# Patient Record
Sex: FEMALE | Race: BLACK OR AFRICAN AMERICAN | NOT HISPANIC OR LATINO | URBAN - METROPOLITAN AREA
[De-identification: names, ages, dates, MRNs, and addresses within clinical notes are randomized per-mention and may not be internally consistent; named-entity substitution may affect disease eponyms.]

---

## 2021-12-14 ENCOUNTER — EMERGENCY (EMERGENCY)
Facility: HOSPITAL | Age: 56
LOS: 0 days | Discharge: HOME | End: 2021-12-14
Attending: STUDENT IN AN ORGANIZED HEALTH CARE EDUCATION/TRAINING PROGRAM
Payer: COMMERCIAL

## 2021-12-14 VITALS
DIASTOLIC BLOOD PRESSURE: 97 MMHG | RESPIRATION RATE: 14 BRPM | HEART RATE: 83 BPM | TEMPERATURE: 98 F | SYSTOLIC BLOOD PRESSURE: 148 MMHG | HEIGHT: 66 IN | OXYGEN SATURATION: 99 % | WEIGHT: 225.97 LBS

## 2021-12-14 DIAGNOSIS — M54.50 LOW BACK PAIN, UNSPECIFIED: ICD-10-CM

## 2021-12-14 DIAGNOSIS — M25.561 PAIN IN RIGHT KNEE: ICD-10-CM

## 2021-12-14 DIAGNOSIS — M54.2 CERVICALGIA: ICD-10-CM

## 2021-12-14 DIAGNOSIS — M25.562 PAIN IN LEFT KNEE: ICD-10-CM

## 2021-12-14 PROCEDURE — 99284 EMERGENCY DEPT VISIT MOD MDM: CPT

## 2021-12-14 RX ORDER — METFORMIN HYDROCHLORIDE 850 MG/1
0 TABLET ORAL
Qty: 0 | Refills: 0 | DISCHARGE

## 2021-12-14 RX ORDER — GABAPENTIN 400 MG/1
0 CAPSULE ORAL
Qty: 0 | Refills: 0 | DISCHARGE

## 2021-12-14 RX ORDER — ATORVASTATIN CALCIUM 80 MG/1
0 TABLET, FILM COATED ORAL
Qty: 0 | Refills: 0 | DISCHARGE

## 2021-12-14 NOTE — ED ADULT NURSE NOTE - OBJECTIVE STATEMENT
A&Ox4, c/o muscle pain in neck, lower back, bilateral knees. pt states she has had pain for 9 years, decided to get check out today because  is sick and she was already here with him. denies chest pain, sob, dizziness, headache, blurry vision.

## 2021-12-14 NOTE — ED PROVIDER NOTE - PATIENT PORTAL LINK FT
You can access the FollowMyHealth Patient Portal offered by [facility] by registering at the following website: [email or web address]. By joining [employer]’s FollowMyHealth portal, you will also be able to view your health information using other applications (apps) compatible with our system.

## 2021-12-14 NOTE — ED PROVIDER NOTE - PHYSICAL EXAMINATION
General: Awake, alert and oriented. No acute distress. Well developed, hydrated and nourished. Appears stated age.   Skin: Skin in warm, dry and intact without rashes or lesions. Appropriate color for ethnicity  HENMT: head normocephalic and atraumatic; bilateral external ears without swelling. no nasal discharge. moist oral mucosa. supple neck, trachea midline  EYES: Conjunctiva clear. nonicteric sclera. EOM intact, Eyelids are normal in appearance without swelling or lesions.  Cardiac: well perfused  Respiratory: breathing comfortably on room air. no audible wheezing or stridor  Abdominal: nondistended  MSK: Neck and back are without deformity, visible external skin changes, or signs of trauma. Curvature of the cervical, thoracic, and lumbar spine are within normal limits. no external signs of trauma. no apparent deficits in ROM of any extremity  Neurological: The patient is awake, alert and oriented to person, place, and time with normal speech. CN 2-12 grossly intact. no apparent deficits. Memory is normal and thought process is intact. No gait abnormalities are appreciated.   Psychiatric: Appropriate mood and affect. Good judgement and insight. No visual or auditory hallucinations.

## 2021-12-14 NOTE — ED PROVIDER NOTE - OBJECTIVE STATEMENT
56f presenting for neck, lower back, and bilateral knee pain for 10-15 years. had a recent normal MRI of her spine and normal xrays of her knees. being evaluated today as her  is here and she figured she might as well get checked out. no recent trauma, falls, or illness

## 2021-12-14 NOTE — ED PROVIDER NOTE - CLINICAL SUMMARY MEDICAL DECISION MAKING FREE TEXT BOX
[FreeTextEntry3] : I, Gregory Fregoso MD, ordering physician, have read and attest that all the information, medical decision making and discharge instructions within are true and accurate. 
declining pain medications. no acute pathology. recent normal imaging. will dc with rheumatology follow up

## 2021-12-14 NOTE — ED ADULT TRIAGE NOTE - STATUS:
Applied 1. I was told the name of the doctor(s) who took care of my child while in the hospital.    2. I have been told about any important findings on my child's plan of care.    3. The doctor clearly explained my child's diagnosis and other possible diagnoses that were considered.    4. My child's doctor explained all the tests that were done and their results (if available). I understand that some of the test results may not be ready before we go home and I was told how I can get these results. I understand that a summary of my child's hospitalization and important test results will be shared with my child's outpatient doctor.    5. My child's doctor talked to me about what I need to do when we go home.    6. I understand what signs and symptoms to watch for. I understand what symptoms I would need to call my doctor for and/or return to the hospital.    7. I have the phone number to call the hospital for results and/or questions after I leave the hospital.

## 2021-12-14 NOTE — ED PROVIDER NOTE - CARE PROVIDER_API CALL
Sinan Krishnamurthy (MD)  Internal Medicine; Rheumatology  [facility] [place] [address]  Phone: [phone]  Fax: [phone]  Follow Up Time:

## 2021-12-14 NOTE — ED ADULT TRIAGE NOTE - CHIEF COMPLAINT QUOTE
Patient ambulatory to triage c/o spine, right  shoulder and upper back and b/l knee pains for some times now. Patient is been followed by her neurologist and had x rays of knees recently. She is here with her  and decided to be checked out while waiting with him.

## 2021-12-14 NOTE — ED ADULT NURSE NOTE - NSFALLRSKUNASSIST_ED_ALL_ED
Additional Notes: Talked to patient about using less fragrant lotion like ceraVe, and all free and clear laundry detergent no

## 2024-04-28 ENCOUNTER — EMERGENCY (EMERGENCY)
Facility: HOSPITAL | Age: 59
LOS: 0 days | Discharge: HOME | End: 2024-04-28
Attending: STUDENT IN AN ORGANIZED HEALTH CARE EDUCATION/TRAINING PROGRAM
Payer: COMMERCIAL

## 2024-04-28 VITALS
TEMPERATURE: 99 F | RESPIRATION RATE: 18 BRPM | HEART RATE: 84 BPM | SYSTOLIC BLOOD PRESSURE: 146 MMHG | DIASTOLIC BLOOD PRESSURE: 83 MMHG | OXYGEN SATURATION: 99 %

## 2024-04-28 VITALS
RESPIRATION RATE: 22 BRPM | HEART RATE: 103 BPM | SYSTOLIC BLOOD PRESSURE: 155 MMHG | HEIGHT: 66 IN | WEIGHT: 229.94 LBS | DIASTOLIC BLOOD PRESSURE: 87 MMHG | TEMPERATURE: 98 F | OXYGEN SATURATION: 96 %

## 2024-04-28 DIAGNOSIS — J18.9 PNEUMONIA, UNSPECIFIED ORGANISM: ICD-10-CM

## 2024-04-28 DIAGNOSIS — J45.909 UNSPECIFIED ASTHMA, UNCOMPLICATED: ICD-10-CM

## 2024-04-28 DIAGNOSIS — E78.00 PURE HYPERCHOLESTEROLEMIA, UNSPECIFIED: ICD-10-CM

## 2024-04-28 DIAGNOSIS — R06.02 SHORTNESS OF BREATH: ICD-10-CM

## 2024-04-28 DIAGNOSIS — E11.9 TYPE 2 DIABETES MELLITUS WITHOUT COMPLICATIONS: ICD-10-CM

## 2024-04-28 DIAGNOSIS — U07.1 COVID-19: ICD-10-CM

## 2024-04-28 DIAGNOSIS — R05.9 COUGH, UNSPECIFIED: ICD-10-CM

## 2024-04-28 DIAGNOSIS — R07.9 CHEST PAIN, UNSPECIFIED: ICD-10-CM

## 2024-04-28 DIAGNOSIS — J90 PLEURAL EFFUSION, NOT ELSEWHERE CLASSIFIED: ICD-10-CM

## 2024-04-28 LAB
ALBUMIN SERPL ELPH-MCNC: 3 G/DL — LOW (ref 3.3–5)
ALP SERPL-CCNC: 106 U/L — SIGNIFICANT CHANGE UP (ref 40–120)
ALT FLD-CCNC: 19 U/L — SIGNIFICANT CHANGE UP (ref 12–78)
ANION GAP SERPL CALC-SCNC: 4 MMOL/L — LOW (ref 5–17)
APTT BLD: 33.2 SEC — SIGNIFICANT CHANGE UP (ref 24.5–35.6)
AST SERPL-CCNC: 20 U/L — SIGNIFICANT CHANGE UP (ref 15–37)
BASOPHILS # BLD AUTO: 0.04 K/UL — SIGNIFICANT CHANGE UP (ref 0–0.2)
BASOPHILS NFR BLD AUTO: 0.6 % — SIGNIFICANT CHANGE UP (ref 0–2)
BILIRUB SERPL-MCNC: 0.2 MG/DL — SIGNIFICANT CHANGE UP (ref 0.2–1.2)
BUN SERPL-MCNC: 14 MG/DL — SIGNIFICANT CHANGE UP (ref 7–23)
CALCIUM SERPL-MCNC: 9.2 MG/DL — SIGNIFICANT CHANGE UP (ref 8.5–10.1)
CHLORIDE SERPL-SCNC: 108 MMOL/L — SIGNIFICANT CHANGE UP (ref 96–108)
CO2 SERPL-SCNC: 30 MMOL/L — SIGNIFICANT CHANGE UP (ref 22–31)
CREAT SERPL-MCNC: 0.83 MG/DL — SIGNIFICANT CHANGE UP (ref 0.5–1.3)
EGFR: 82 ML/MIN/1.73M2 — SIGNIFICANT CHANGE UP
EOSINOPHIL # BLD AUTO: 0.21 K/UL — SIGNIFICANT CHANGE UP (ref 0–0.5)
EOSINOPHIL NFR BLD AUTO: 3.3 % — SIGNIFICANT CHANGE UP (ref 0–6)
FLUAV AG NPH QL: SIGNIFICANT CHANGE UP
FLUBV AG NPH QL: SIGNIFICANT CHANGE UP
GLUCOSE SERPL-MCNC: 114 MG/DL — HIGH (ref 70–99)
HCT VFR BLD CALC: 35.8 % — SIGNIFICANT CHANGE UP (ref 34.5–45)
HGB BLD-MCNC: 12 G/DL — SIGNIFICANT CHANGE UP (ref 11.5–15.5)
IMM GRANULOCYTES NFR BLD AUTO: 0.2 % — SIGNIFICANT CHANGE UP (ref 0–0.9)
INR BLD: 0.95 RATIO — SIGNIFICANT CHANGE UP (ref 0.85–1.18)
LYMPHOCYTES # BLD AUTO: 1.86 K/UL — SIGNIFICANT CHANGE UP (ref 1–3.3)
LYMPHOCYTES # BLD AUTO: 29.6 % — SIGNIFICANT CHANGE UP (ref 13–44)
MCHC RBC-ENTMCNC: 28.4 PG — SIGNIFICANT CHANGE UP (ref 27–34)
MCHC RBC-ENTMCNC: 33.5 G/DL — SIGNIFICANT CHANGE UP (ref 32–36)
MCV RBC AUTO: 84.6 FL — SIGNIFICANT CHANGE UP (ref 80–100)
MONOCYTES # BLD AUTO: 0.5 K/UL — SIGNIFICANT CHANGE UP (ref 0–0.9)
MONOCYTES NFR BLD AUTO: 7.9 % — SIGNIFICANT CHANGE UP (ref 2–14)
NEUTROPHILS # BLD AUTO: 3.67 K/UL — SIGNIFICANT CHANGE UP (ref 1.8–7.4)
NEUTROPHILS NFR BLD AUTO: 58.4 % — SIGNIFICANT CHANGE UP (ref 43–77)
NRBC # BLD: 0 /100 WBCS — SIGNIFICANT CHANGE UP (ref 0–0)
NT-PROBNP SERPL-SCNC: 123 PG/ML — SIGNIFICANT CHANGE UP (ref 0–125)
PLATELET # BLD AUTO: 320 K/UL — SIGNIFICANT CHANGE UP (ref 150–400)
POTASSIUM SERPL-MCNC: 3.6 MMOL/L — SIGNIFICANT CHANGE UP (ref 3.5–5.3)
POTASSIUM SERPL-SCNC: 3.6 MMOL/L — SIGNIFICANT CHANGE UP (ref 3.5–5.3)
PROT SERPL-MCNC: 8.2 GM/DL — SIGNIFICANT CHANGE UP (ref 6–8.3)
PROTHROM AB SERPL-ACNC: 11.3 SEC — SIGNIFICANT CHANGE UP (ref 9.5–13)
RBC # BLD: 4.23 M/UL — SIGNIFICANT CHANGE UP (ref 3.8–5.2)
RBC # FLD: 15.1 % — HIGH (ref 10.3–14.5)
SARS-COV-2 RNA SPEC QL NAA+PROBE: DETECTED
SODIUM SERPL-SCNC: 142 MMOL/L — SIGNIFICANT CHANGE UP (ref 135–145)
TROPONIN I, HIGH SENSITIVITY RESULT: 8.2 NG/L — SIGNIFICANT CHANGE UP
WBC # BLD: 6.29 K/UL — SIGNIFICANT CHANGE UP (ref 3.8–10.5)
WBC # FLD AUTO: 6.29 K/UL — SIGNIFICANT CHANGE UP (ref 3.8–10.5)

## 2024-04-28 PROCEDURE — 93010 ELECTROCARDIOGRAM REPORT: CPT

## 2024-04-28 PROCEDURE — 71260 CT THORAX DX C+: CPT | Mod: 26,MC

## 2024-04-28 PROCEDURE — 99285 EMERGENCY DEPT VISIT HI MDM: CPT

## 2024-04-28 PROCEDURE — 71046 X-RAY EXAM CHEST 2 VIEWS: CPT | Mod: 26

## 2024-04-28 RX ORDER — IPRATROPIUM/ALBUTEROL SULFATE 18-103MCG
3 AEROSOL WITH ADAPTER (GRAM) INHALATION ONCE
Refills: 0 | Status: COMPLETED | OUTPATIENT
Start: 2024-04-28 | End: 2024-04-28

## 2024-04-28 RX ORDER — CEFPODOXIME PROXETIL 100 MG
200 TABLET ORAL ONCE
Refills: 0 | Status: DISCONTINUED | OUTPATIENT
Start: 2024-04-28 | End: 2024-04-28

## 2024-04-28 RX ORDER — CEFUROXIME AXETIL 250 MG
1 TABLET ORAL
Qty: 10 | Refills: 0
Start: 2024-04-28 | End: 2024-05-02

## 2024-04-28 RX ORDER — METHOCARBAMOL 500 MG/1
750 TABLET, FILM COATED ORAL ONCE
Refills: 0 | Status: COMPLETED | OUTPATIENT
Start: 2024-04-28 | End: 2024-04-28

## 2024-04-28 RX ORDER — METHOCARBAMOL 500 MG/1
1 TABLET, FILM COATED ORAL
Qty: 9 | Refills: 0
Start: 2024-04-28 | End: 2024-04-30

## 2024-04-28 RX ORDER — CEFUROXIME AXETIL 250 MG
500 TABLET ORAL ONCE
Refills: 0 | Status: COMPLETED | OUTPATIENT
Start: 2024-04-28 | End: 2024-04-28

## 2024-04-28 RX ORDER — ACETAMINOPHEN 500 MG
1000 TABLET ORAL ONCE
Refills: 0 | Status: COMPLETED | OUTPATIENT
Start: 2024-04-28 | End: 2024-04-28

## 2024-04-28 RX ADMIN — Medication 100 MILLIGRAM(S): at 16:14

## 2024-04-28 RX ADMIN — METHOCARBAMOL 750 MILLIGRAM(S): 500 TABLET, FILM COATED ORAL at 21:41

## 2024-04-28 RX ADMIN — Medication 500 MILLIGRAM(S): at 21:19

## 2024-04-28 RX ADMIN — Medication 400 MILLIGRAM(S): at 21:14

## 2024-04-28 RX ADMIN — Medication 3 MILLILITER(S): at 16:37

## 2024-04-28 NOTE — ED PROVIDER NOTE - NS ED ROS FT
CONSTITUTIONAL: No fever, no chills, no fatigue  EYES: No visual changes  ENT: No ear pain, no sore throat  CARDIOVASCULAR:  no palpitations  GI: No abdominal pain, no nausea, no vomiting, no constipation, no diarrhea  GENITOURINARY: No dysuria, no frequency, no hematuria  MUSKULOSKELETAL: No backpain, no joint pain, no myalgias  SKIN: No rash  NEURO: No headache    ALL OTHER SYSTEMS NEGATIVE.

## 2024-04-28 NOTE — ED ADULT TRIAGE NOTE - CHIEF COMPLAINT QUOTE
pt c/o chest pain and shortness of breath for a week. pt was seen at urgent care today, had x-ray that shows possible PNA. history of dm and high cholesterol. fs 187

## 2024-04-28 NOTE — ED PROVIDER NOTE - OBJECTIVE STATEMENT
57 y/o female with dm, high chol, asthma presents with dyspnea and chest pain x 1 week. Pt reports having cough , sore throat. Pt went to  last monday and was given azithromycin for PNA. Pt went to follow up  today and had cxr done showing PNA  and pleural effusion and told to come here. Denies recent travel. Pt states  sick at home with cough. Denies fever, chills, nausea, vomiting, abdominal pain, diarrhea.

## 2024-04-28 NOTE — ED PROVIDER NOTE - ATTENDING APP SHARED VISIT CONTRIBUTION OF CARE
Lena: Pt seen w/ PA, 58F PMH HLD, DM sent to ED d/t worsening PNA. Pt reports CP, SOB and cough, went to UC, prescribed Z-daniela for R sided PNA. Pt w/ persistent symptoms, rpt CXR w/ R pleural effusion and L sided PNA. Afebrile, VSS. Agree w/ planned w/u and dispo. Lena: Pt seen w/ PA, 58F PMH HLD, DM sent to ED d/t worsening PNA. Pt reports ST, cough, body aches, CP and SOB since last Tues, went to UC last Sun, prescribed Z-daniela for 'chest infection.' Pt reports improvement in pain, but worsening SOB, 'spasms.' Pt return to UC today, CXR w/ R pleural effusion and L sided PNA. Pt reports similar sx w/ pleurisy in 2006. Afebrile, VSS. Agree w/ planned w/u and dispo.

## 2024-04-28 NOTE — ED PROVIDER NOTE - CLINICAL SUMMARY MEDICAL DECISION MAKING FREE TEXT BOX
59 y/o female with dm, high chol, asthma here with chest pain and dyspnea x 1 week. Vs stable.   Will obtain basic labs including cardiac, and repeat cxr, flu/covid and treat with tessalon perles, neb and reassess. 59 y/o female with dm, high chol, asthma here with chest pain and dyspnea x 1 week. Vs stable.   Will obtain basic labs including cardiac, and repeat cxr, flu/covid , and possiblly get ct chest will also  treat with tessalon perles, neb and reassess.    labs reviewed and unremarkable. (+) COVID.   CT chest: There is an approximately 2.5 x 6.9 cm soft tissue mass within the   anterior mediastinum, concerning for malignancy. Correlation with prior   imaging is recommended if available. Further evaluation with either chest   MRI without and with contrast and/or biopsy is suggested.    Small right pleural effusion with multifocal bibasilar streaky opacities   and right-sided volume loss, favoring multifocal atelectasis, though   superimposed infection is possible in the appropriate clinical setting.    Pt made aware of the ct chest results and advised to follow up with PMD and pulmonary outpatient. WIll also cover with ABX cefuroxime.   Vs otherwise stable. Not hypoxic. Pt stable to be discharged home. Strict return precautions given.

## 2024-04-28 NOTE — ED ADULT NURSE NOTE - NSFALLUNIVINTERV_ED_ALL_ED
Bed/Stretcher in lowest position, wheels locked, appropriate side rails in place/Call bell, personal items and telephone in reach/Instruct patient to call for assistance before getting out of bed/chair/stretcher/Non-slip footwear applied when patient is off stretcher/Scenic to call system/Physically safe environment - no spills, clutter or unnecessary equipment/Purposeful proactive rounding/Room/bathroom lighting operational, light cord in reach

## 2024-04-28 NOTE — ED ADULT NURSE REASSESSMENT NOTE - NS ED NURSE REASSESS COMMENT FT1
Patient placed on airborne precautions due to COVID positive results. No SOB noted to patient. Cardiac monitoring in place. Will continue to monitor.

## 2024-04-28 NOTE — ED PROVIDER NOTE - NSFOLLOWUPINSTRUCTIONS_ED_ALL_ED_FT
Rest, drink plenty of fluids.  Advance activity as tolerated.  Continue all previously prescribed medications as directed.  Follow up with your primary care physician and pulmonary  in 48-72 hours- bring copies of your results.  Return to the ER for worsening or persistent symptoms, and/or ANY NEW OR CONCERNING SYMPTOMS. If you have issues obtaining follow up, please call: [phone] to obtain a doctor or specialist who takes your insurance in your area.  You may call [phone] to make an appointment with the internal medicine clinic.

## 2024-04-28 NOTE — ED PROVIDER NOTE - CARE PLAN
Principal Discharge DX:	Pneumonia   1 Principal Discharge DX:	Pneumonia  Secondary Diagnosis:	COVID-19 virus infection

## 2024-04-28 NOTE — ED PROVIDER NOTE - PATIENT PORTAL LINK FT
You can access the FollowMyHealth Patient Portal offered by [facility] by registering at the following website: [email or web address]. By joining [employer]’s FollowMyHealth portal, you will also be able to view your health information using other applications (apps) compatible with our system.

## 2024-04-28 NOTE — ED PROVIDER NOTE - NSFOLLOWUPCLINICS_GEN_ALL_ED_FT
[facility] Pulmonolgy and Sleep Medicine  Pulmonology  [address] [place] [address], [facility] [address]  [place] [address]  Phone: [phone]  Fax:   Follow Up Time: 1-3 Days

## 2024-05-01 PROBLEM — Z00.00 ENCOUNTER FOR PREVENTIVE HEALTH EXAMINATION: Status: ACTIVE | Noted: 2024-05-01

## 2024-05-06 ENCOUNTER — APPOINTMENT (OUTPATIENT)
Dept: PULMONOLOGY | Facility: CLINIC | Age: 59
End: 2024-05-06
Payer: COMMERCIAL

## 2024-05-06 VITALS
OXYGEN SATURATION: 100 % | WEIGHT: 229 LBS | HEIGHT: 66 IN | SYSTOLIC BLOOD PRESSURE: 145 MMHG | HEART RATE: 84 BPM | DIASTOLIC BLOOD PRESSURE: 87 MMHG | BODY MASS INDEX: 36.8 KG/M2

## 2024-05-06 VITALS
WEIGHT: 231 LBS | TEMPERATURE: 98.1 F | RESPIRATION RATE: 15 BRPM | OXYGEN SATURATION: 96 % | HEIGHT: 66 IN | SYSTOLIC BLOOD PRESSURE: 134 MMHG | BODY MASS INDEX: 37.12 KG/M2 | HEART RATE: 77 BPM | DIASTOLIC BLOOD PRESSURE: 76 MMHG

## 2024-05-06 PROCEDURE — 94729 DIFFUSING CAPACITY: CPT

## 2024-05-06 PROCEDURE — 99203 OFFICE O/P NEW LOW 30 MIN: CPT | Mod: 25

## 2024-05-06 PROCEDURE — 94060 EVALUATION OF WHEEZING: CPT

## 2024-05-06 PROCEDURE — 94726 PLETHYSMOGRAPHY LUNG VOLUMES: CPT

## 2024-05-06 RX ORDER — ALBUTEROL SULFATE 90 UG/1
108 (90 BASE) INHALANT RESPIRATORY (INHALATION)
Qty: 1 | Refills: 11 | Status: ACTIVE | COMMUNITY
Start: 2024-05-06 | End: 1900-01-01

## 2024-05-07 ENCOUNTER — NON-APPOINTMENT (OUTPATIENT)
Age: 59
End: 2024-05-07

## 2024-05-08 ENCOUNTER — NON-APPOINTMENT (OUTPATIENT)
Age: 59
End: 2024-05-08

## 2024-05-08 LAB
AFP-TM SERPL-MCNC: 11.8 NG/ML
HCG SERPL-MCNC: <1 MIU/ML
LDH SERPL-CCNC: 223 U/L
TSH SERPL-ACNC: 2.74 UIU/ML

## 2024-05-08 NOTE — ADDENDUM
[FreeTextEntry1] : Addendum (Natalia; 5/8/24): I called Ms. Martinez about her biomarker studies for her medisatinal mass  hCG: negative AFP 11.8 (wnl <8.3) LDH: 223 TSH: 2.74  The AFP is slightly elevated, which is of unclear significance. This evaluation makes teratoma or lymphoma less likely. She has a PET/CT scheduled for next week as well as her visit with Dr. Crocker. She has a GI visit on Friday. She will call me with questions.

## 2024-05-08 NOTE — REVIEW OF SYSTEMS
[Negative] : Endocrine [TextBox_3] : Chronic intermittent night sweats; weight gain [TextBox_30] : Pleuritic chest pain that is improving since her ER visit

## 2024-05-08 NOTE — PHYSICAL EXAM
[No Acute Distress] : no acute distress [Normal Oropharynx] : normal oropharynx [Normal Appearance] : normal appearance [No Neck Mass] : no neck mass [Normal Rate/Rhythm] : normal rate/rhythm [Normal S1, S2] : normal s1, s2 [No Murmurs] : no murmurs [No Resp Distress] : no resp distress [Clear to Auscultation Bilaterally] : clear to auscultation bilaterally [No Abnormalities] : no abnormalities [Benign] : benign [Normal Gait] : normal gait [No Clubbing] : no clubbing [No Cyanosis] : no cyanosis [No Edema] : no edema [FROM] : FROM [Normal Color/ Pigmentation] : normal color/ pigmentation [No Focal Deficits] : no focal deficits [Oriented x3] : oriented x3 [Normal Affect] : normal affect [TextBox_11] : NC/AT

## 2024-05-08 NOTE — ASSESSMENT
[FreeTextEntry1] : Labs 4/2024 WBC 6.29, Hgb 12.0, Plts 320 Na 142, K 3.6, Cl 108, HCO3 30, BUN/creat 14/0.83, glucose 114 Tbili 0.2, AST/ALT 20/19, Alk phos 106, TP/Albumin 8.2/3.0 + COVID  Chest CT: IMPRESSION: There is an approximately 2.5 x 6.9 cm soft tissue mass within the anterior mediastinum, concerning for malignancy. Correlation with prior imaging is recommended if available. Further evaluation with either chest MRI without and with contrast and/or biopsy is suggested.  Small right pleural effusion with multifocal bibasilar streaky opacities and right-sided volume loss, favoring multifocal atelectasis, though superimposed infection is possible in the appropriate clinical setting.  PFTs:              5/2024 FEV1/FVC      80% FEV1               1.63, 69% FVC                 2.03, 68% TLC                 3.25, 68% RV                   1.31, 73% ERV                0.31, 35% DLCO              19.2, 84% -+ Bronchodilator response 5/2024  Asthma control test 5/2024: 22  A/P: 59 yo F h/o asthma (years), obesity, and DM presents with an anterior mediastinal mass discovered during COVID infection with pleuritic chest pain.  Ms. Martinez's pleuritic pain is improved compared to last week. Her asthma is well-controlled, and she did not have wheezing during last week's infection. Her PFTs show restriction and positive bronchodilator response, likely due to obesity (reduced ERV) and asthma. The inspiratory limb of the flow-volume loop has borderline flattening, which can be seen in the setting of variable extra-thoracic large airway obstruction.   We discussed the potential etiologies for an anterior mediastinal mass, including thymoma, thyroid malignancy, teratoma, or lymphoma. She reports chronic night sweats, though no fever, chills, or weight loss. I reviewed her case with Dr. Crocker, and we will obtain a PET/CT to better characterize the mass. Given her COVID infection and potential inflammatory findings last week, she would ideally obtain the PET/CT in 2 weeks. In the interim, we will obtain TSH, LDH, hCG, and AFP.   For the abnormal flow volume loop, I suspect testing error. Since she is due to an appointment with Dr. Velasquez, I requested that she arrange a visit and request laryngeal evaluation.  For her asthma, we discussed a trial of PRN LABA/ICS. She prefers to maintain her Albuterol, though she asks for a refill.   1. Asthma: well-controlled 2. Anterior mediastinal mass 3. Restrictive lung disease: ? obesity 4. ? Flattening on inspiratory limb of flow-volume loop 5. Resolving COVID with pleuritic chest pain  -PET/CT in 2 weeks; check AFP, hCG, TSH, LDH -PRN Albuterol -evaluation by Dr. Crocker next week -recommended ENT evaluation now -follow-up with me in 3-4 weeks with PFTs A/P:

## 2024-05-08 NOTE — HISTORY OF PRESENT ILLNESS
[Never] : never [TextBox_4] : I saw Ms. Izzy Martinez today in follow-up for an ER visit with abnormal chest imaging. In review, Ms. Martinez is a 57 yo F h/o obesity c/b DM, HLD, and asthma who had urgent care visits for pleuritic chest pain on 23 (treated with Azithromycin) and 23. On the  visit, she was found to have COVID and was transferred to the ER where chest CT identified an anterior mediastinal mass.  Today, Ms. Martinez reports overall feeling better than during her ER visit. The pleuritic chest pain is significantly improved. She endorses intermittent hiccups recently and longer-term weight gain. She denies fever, chills, weight loss, or weakness. She has had night sweats for "years" without recent change.   Ms. Martinez reports a hospitalization in  for "pleurisy and pleural effusion." She did not undergo thoracentesis; she was treated with antibiotics and steroids with improvement.  Ms. Martinez is cared for by Dr. Hernadez in primary care who arranged her upcoming visit with Dr. Crocker. She has been seen for her asthma by Dr. Wilson (Ethel) and treated with Asmanex/Mometasone; she has not used ICS nor Albuterol in several years. She has previously seen Dr. Isaak Velasquez with ENT for sinus disease; she denies any throat symptoms or pain.  PMH: Obesity DM HLD Asthma  PSH: Breast biopsy  FH: Father with CAD Mother  suddenly (unknown etiology)  SH: Ms. Martinez is from [place]. She currently lives in [facility]; she moved to the  in . 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  Ms. Martinez is a never smoker. She denies vaping or drug use. She drinks alcohol rarely.  Ms. Martinez endorses exposure to dust. She denies exposure to Asbestos, Tuberculosis, mold, smoke, fumes, birds, feathers, hot tubs, heavy metals, or Beryllium.

## 2024-05-14 ENCOUNTER — OUTPATIENT (OUTPATIENT)
Dept: OUTPATIENT SERVICES | Facility: HOSPITAL | Age: 59
LOS: 1 days | End: 2024-05-14
Payer: COMMERCIAL

## 2024-05-14 ENCOUNTER — APPOINTMENT (OUTPATIENT)
Dept: NUCLEAR MEDICINE | Facility: IMAGING CENTER | Age: 59
End: 2024-05-14
Payer: COMMERCIAL

## 2024-05-14 ENCOUNTER — APPOINTMENT (OUTPATIENT)
Dept: THORACIC SURGERY | Facility: HOSPITAL | Age: 59
End: 2024-05-14
Payer: COMMERCIAL

## 2024-05-14 ENCOUNTER — APPOINTMENT (OUTPATIENT)
Dept: THORACIC SURGERY | Facility: CLINIC | Age: 59
End: 2024-05-14
Payer: COMMERCIAL

## 2024-05-14 DIAGNOSIS — D49.89 NEOPLASM OF UNSPECIFIED BEHAVIOR OF OTHER SPECIFIED SITES: ICD-10-CM

## 2024-05-14 PROCEDURE — A9552: CPT

## 2024-05-14 PROCEDURE — 78815 PET IMAGE W/CT SKULL-THIGH: CPT | Mod: 26,PI

## 2024-05-14 PROCEDURE — 78815 PET IMAGE W/CT SKULL-THIGH: CPT

## 2024-05-17 ENCOUNTER — APPOINTMENT (OUTPATIENT)
Dept: PULMONOLOGY | Facility: CLINIC | Age: 59
End: 2024-05-17
Payer: COMMERCIAL

## 2024-05-17 PROCEDURE — 99442: CPT

## 2024-05-17 NOTE — REASON FOR VISIT
Subjective   Patient ID: Cheri is a 65 year old female.    Chief Complaint   Patient presents with   • Follow-up     Diabetes     HPI   66 yo Filipina F here for check-up.  (+)DM type II w/ SZQ=637.  GosF7K=1.2.  GFR >90.  On Lantus Insulin 70 u SQ/day  Metformin 500 mg. BID  Onglyza 5 mg./day  (+)Hypertension. Controlled.  On Losartan 100 mg./day.  Carvedilol 6.25 mg./day    (+)Hyperlipidemia. On low-lipid diet & meds.  Gemfibrozil 600 mg. BID  Lovaza 2 caps. BID.  Cholesterol=165; LDL=53.  (+)Hx of Nephrolithiasis. Drinks 8 glasses of water per day. No recent renal colic.  (+)Chronic gout doing well on Allopurinol 100 mg./day & low-purine diet.  Feeling fine.      Patient's medications, allergies, past medical, surgical, social and family histories were reviewed and updated as appropriate.    Review of Systems   Constitutional: Negative for activity change, appetite change, chills, fatigue, fever and unexpected weight change.   HENT: Negative for congestion, ear pain, sinus pain, sore throat, trouble swallowing and voice change.    Eyes: Negative for photophobia, pain and redness.   Respiratory: Negative for cough, shortness of breath and wheezing.    Cardiovascular: Negative for chest pain, palpitations and leg swelling.   Gastrointestinal: Negative for abdominal pain, blood in stool, constipation, diarrhea and nausea.   Endocrine: Negative for cold intolerance, heat intolerance, polydipsia, polyphagia and polyuria.   Genitourinary: Negative for difficulty urinating, dysuria, flank pain, hematuria and vaginal bleeding.   Musculoskeletal: Negative for arthralgias, back pain, gait problem, joint swelling and myalgias.   Skin: Negative for color change, pallor, rash and wound.   Allergic/Immunologic: Negative for environmental allergies, food allergies and immunocompromised state.   Neurological: Negative for dizziness, tremors, seizures, syncope, speech difficulty, weakness, light-headedness, numbness and  headaches.   Hematological: Negative for adenopathy.   Psychiatric/Behavioral: Negative for behavioral problems, confusion, decreased concentration, dysphoric mood, hallucinations, sleep disturbance and suicidal ideas. The patient is not nervous/anxious.        Objective   Physical Exam   Constitutional: She is oriented to person, place, and time. She appears well-developed and well-nourished. No distress.   HENT:   Head: Normocephalic and atraumatic.   Nose: Nose normal.   Mouth/Throat: Oropharynx is clear and moist.   Eyes: Conjunctivae and EOM are normal. Pupils are equal, round, and reactive to light. Right eye exhibits no discharge. Left eye exhibits no discharge. No scleral icterus.   Neck: Normal range of motion. Neck supple. No JVD present. No thyromegaly present.   Cardiovascular: Normal rate, regular rhythm, normal heart sounds and intact distal pulses. Exam reveals no gallop and no friction rub.   No murmur heard.  Pulmonary/Chest: Effort normal and breath sounds normal. No respiratory distress. She has no wheezes. She has no rales. She exhibits no tenderness.   Abdominal: Soft. Bowel sounds are normal. She exhibits no distension and no mass. There is no tenderness. There is no rebound and no guarding. No hernia.   Musculoskeletal: Normal range of motion. She exhibits no edema, tenderness or deformity.   Lymphadenopathy:     She has no cervical adenopathy.   Neurological: She is alert and oriented to person, place, and time. No cranial nerve deficit or sensory deficit. She exhibits normal muscle tone. Coordination normal.   Skin: Skin is warm and dry. No rash noted. No erythema. No pallor.   Psychiatric: She has a normal mood and affect. Her behavior is normal. Judgment and thought content normal.   Vitals reviewed.      Assessment   Problem List Items Addressed This Visit        Circulatory    Benign essential hypertension    Relevant Medications    losartan (COZAAR) 100 MG tablet    gemfibrozil (LOPID)  600 MG tablet    omega-3 acid ethyl esters (LOVAZA) 1 g capsule       Urinary    Nephrolithiasis       Endocrine    Chronic gout    Relevant Medications    allopurinol (ZYLOPRIM) 100 MG tablet    Type 2 diabetes mellitus (CMS/HCC) - Primary    Relevant Medications    insulin glargine (LANTUS) 100 UNIT/ML injectable solution    metFORMIN (GLUCOPHAGE) 500 MG tablet    SAXagliptin HCl (ONGLYZA) 5 MG Tab    Insulin Pen Needle 29G X 12.7MM Misc    Other Relevant Orders    SERVICE TO OPHTHALMOLOGY    Hyperlipidemia    Relevant Medications    losartan (COZAAR) 100 MG tablet    gemfibrozil (LOPID) 600 MG tablet    omega-3 acid ethyl esters (LOVAZA) 1 g capsule      Other Visit Diagnoses     Essential hypertension        Relevant Medications    losartan (COZAAR) 100 MG tablet    gemfibrozil (LOPID) 600 MG tablet    omega-3 acid ethyl esters (LOVAZA) 1 g capsule      Plan:  See orders.  CPM. See Meds list. I gave Rx refills.  Discussed healthy diet & lifestyle.  1800 zia. ADA diet. Low-salt, low-lipid diet.   recommended the Mediterranean diet.  Increase physical activity./ Exercise 30 min./day or more.  Eat well-balanced meals w/ 8 servings of vegetables & fruits/day on time.  Eat whole grains & nuts like walnuts, pecans, slivered almonds.  Eat fish 3x/week or more. Do not duarte. Better to boil or bake.  Extra-virgin olive oil 1 tsp./day mixed w/ food.  Sleep 8 hrs./night.  Use car seatbelts regularly.  Have CO & smoke detectors in the home.  #Drink 8 glasses of water per day to prevent Urolithiasis formation.  RTC after 3 mos. Or earlier prn.     [Initial] : an initial visit [TextBox_44] : Anterior mediastinal mass

## 2024-05-17 NOTE — ASSESSMENT
[FreeTextEntry1] : Labs 4/2024 WBC 6.29, Hgb 12.0, Plts 320 Na 142, K 3.6, Cl 108, HCO3 30, BUN/creat 14/0.83, glucose 114 Tbili 0.2, AST/ALT 20/19, Alk phos 106, TP/Albumin 8.2/3.0 + COVID  hCG: negative AFP 11.8 (wnl <8.3) LDH: 223 TSH: 2.74  Chest CT (4/2024): IMPRESSION: There is an approximately 2.5 x 6.9 cm soft tissue mass within the anterior mediastinum, concerning for malignancy. Correlation with prior imaging is recommended if available. Further evaluation with either chest MRI without and with contrast and/or biopsy is suggested. Small right pleural effusion with multifocal bibasilar streaky opacities and right-sided volume loss, favoring multifocal atelectasis, though superimposed infection is possible in the appropriate clinical setting.  PET/CT (5/2024): IMPRESSION:  1. Anterior mediastinal soft tissue mass, predominantly with minimal FDG avidity (comparable to mediastinal blood pool activity), with small focus of slightly greater activity just to the right of midline. Etiology remains indeterminate. Histologic evaluation is recommended. 2. FDG-avid prevascular, bilateral axillary and bilateral cervical lymph node are nonspecific. The nodes are similar, slightly increased in size, or better seen than on CT dated 4/28/2024. Cervical and axillary lymph nodes may be further evaluated with ultrasound and percutaneous needle biopsy, as clinically indicated. 3. Mildly FDG-avid subcentimeter soft tissue nodule, inferomedial aspect of left breast. Differential diagnosis includes malignancy. Please correlate with mammogram and breast ultrasound and biopsy, as indicated, for further characterization. 4. Trace right pleural effusion or pericardial effusions are unchanged. Non-FDG-avid bibasilar linear opacities are favored to represent atelectasis. 5. Indeterminate FDG avid focus within right hip joint. This may represent a focus of synovitis. MRI may be obtained for further evaluation.  PFTs:              5/2024 FEV1/FVC      80% FEV1               1.63, 69% FVC                 2.03, 68% TLC                 3.25, 68% RV                   1.31, 73% ERV                0.31, 35% DLCO              19.2, 84% -+ Bronchodilator response 5/2024  Asthma control test 5/2024: 22  A/P: 60 yo F h/o asthma (years), obesity, and DM presents with an anterior mediastinal mass discovered during COVID infection with pleuritic chest pain.  Ms. Curry is feeling overwhelmed about the multiple stressors and PET/CT findings. For her mediastinum, there is a small focal area of uptake, though the majority of the mass is "cold." My highest suspicion would be for thymoma. She inquired about the need for tissue biopsy or surgery; she has a visit with Dr. Crocker next week, and I will defer to him.  For the cervical and axillary lymph nodes, she had recent COVID, which could explain the adenopathy and why there is borderline improvement. For her breast nodule, I will request a mammogram with axillary US.  For her R hip uptake, arthritis seems most likely. I would like to obtain the above evaluation before considering an MRI of her hip.  Ms. Martinez was most concerned about her R lower chest pain that is continuous and burning. She denies alarm signs (exertional component, dizziness, syncope), though the pain is persistent. She has not yet tried the Dicyclomine from her GI visit. I additionally recommended that she try Tylenol and Famotidine for pleuritic and GERD contributions.  1. Asthma: well-controlled 2. Anterior mediastinal mass 3. Restrictive lung disease: ? obesity 4. ? Flattening on inspiratory limb of flow-volume loop 5. Resolving COVID with pleuritic chest pain 6. Abnormal PET/CT: cervical lymph nodes, axillary lymph nodes, breast nodule, R hip uptake  -appreciate Thoracic surgery consult -she is following with her ENT -update mammogram and US -trial Tylenol PRN, Famotidine, and Dicyclomine for her chest/abdominal discomfort/burning -consider hip MRI in the future -PRN Albuterol -follow-up with me in 3-4 weeks with PFTs

## 2024-05-17 NOTE — HISTORY OF PRESENT ILLNESS
[Never] : never [TextBox_4] : I spoke to Ms. Izzy Martinez today in follow-up for her anterior mediastinal mass;  ewas located at home at the time of our visit. In review, Ms. Martinez is a 58 yo F h/o obesity c/b DM, HLD, and asthma who had urgent care visits for pleuritic chest pain on 23 (treated with Azithromycin) and 23. On the  visit, she was found to have COVID and was transferred to the ER where chest CT identified an anterior mediastinal mass.  Ms. Martinez is cared for by Dr. Hernadez in primary care who arranged her upcoming visit with Dr. Crocker. She has been seen for her asthma by Dr. Wilson ([place]) and treated with Asmanex/Mometasone; she has not used ICS nor Albuterol in several years. She has previously seen Dr. Isaak Velasquez with ENT for sinus disease; she denies any throat symptoms or pain.  24: Today, Ms. Martinez reports overall feeling better than during her ER visit. The pleuritic chest pain is significantly improved. She endorses intermittent hiccups recently and longer-term weight gain. She denies fever, chills, weight loss, or weakness. She has had night sweats for "years" without recent change. Ms. Martinez reports a hospitalization in  for "pleurisy and pleural effusion." She did not undergo thoracentesis; she was treated with antibiotics and steroids with improvement.  24: Ms. Martinez had her PET/CT last week. She has multiple concerns and symptoms today, including "burning" lower right chest pain that is constant and is making it difficult for her to sleep. She does not have worsening with exertion or relief with rest. She saw her GI physician last week who recommended a trial of Dicyclomine and an US (? RUQ US). She does not have dizziness or dyspnea. She additionally has bilateral hip and knee pains.  We reviewed her PET/CT scan findings. She reports her mammogram with Dr. Hernadez in 10/2023.  PMH: Obesity DM HLD Asthma Anterior mediastinal mass Breast nodule  PSH: Breast biopsy  FH: Father with CAD Mother  suddenly (unknown etiology)  SH: Ms. Martinez is from [place]. She currently lives in [facility]; [place] in . 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  Ms. Martinez is a never smoker. She denies vaping or drug use. She drinks alcohol rarely.  Ms. Martinez endorses exposure to dust. She denies exposure to Asbestos, Tuberculosis, mold, smoke, fumes, birds, feathers, hot tubs, heavy metals, or Beryllium.

## 2024-05-21 ENCOUNTER — APPOINTMENT (OUTPATIENT)
Dept: THORACIC SURGERY | Facility: CLINIC | Age: 59
End: 2024-05-21
Payer: COMMERCIAL

## 2024-05-21 VITALS — BODY MASS INDEX: 36.96 KG/M2 | OXYGEN SATURATION: 99 % | WEIGHT: 230 LBS | HEIGHT: 66 IN | HEART RATE: 89 BPM

## 2024-05-21 DIAGNOSIS — R12 HEARTBURN: ICD-10-CM

## 2024-05-21 DIAGNOSIS — Z86.39 PERSONAL HISTORY OF OTHER ENDOCRINE, NUTRITIONAL AND METABOLIC DISEASE: ICD-10-CM

## 2024-05-21 DIAGNOSIS — Z87.09 PERSONAL HISTORY OF OTHER DISEASES OF THE RESPIRATORY SYSTEM: ICD-10-CM

## 2024-05-21 PROCEDURE — 99204 OFFICE O/P NEW MOD 45 MIN: CPT

## 2024-05-21 RX ORDER — DICYCLOMINE HYDROCHLORIDE 10 MG/1
10 CAPSULE ORAL
Refills: 0 | Status: ACTIVE | COMMUNITY

## 2024-05-21 RX ORDER — METFORMIN HYDROCHLORIDE 500 MG/1
500 TABLET, COATED ORAL
Refills: 0 | Status: ACTIVE | COMMUNITY

## 2024-05-21 RX ORDER — ATORVASTATIN CALCIUM 40 MG/1
40 TABLET, FILM COATED ORAL
Refills: 0 | Status: ACTIVE | COMMUNITY

## 2024-05-21 NOTE — ASSESSMENT
[FreeTextEntry1] : Ms. AYAKA GONZALES, 58 year old female, never smoker, w/ hx of DM, HLD, asthma, esophageal spasm, who presented to [facility] ED on 04/28/2024 for chest pain and dyspnea. Prior the ED visit, patient went to  on 04/22/2024 was given azithromycin for PNA.  + COVID on 04/28/2024. CT chest showed anterior mediastinal mass. Patient was discharged with Cefuroxime. Referred by Dr. Jax Hernadez (PCP).   CT chest on 04/28/2024: - here is asymmetric right-sided volume loss with elevation the right hemidiaphragm and streaky bibasilar opacities, right greater than left, with some possible superimposed consolidative components within the right middle lower lobes. There is a 4 mm nodule within the inferior aspect of the right upper lobe (7-63).  - Small right pleural effusion. -  There is a 2.5 x 6.9 cm soft tissue mass within the anterior mediastinum superiorly (6-60). There is also a small amount of fluid along the ascending thoracic aorta, which could be due to a prominent pericardial recess.  Patient established care with Dr. Jared Fisher (Pulm) on 05/06/2024.  PFTs on 05/06/2024: FEV1 1.44 (61%), DLCO 84%.   PET/CT on 05/14/2024:  - Anterior mediastinal soft tissue mass, predominantly with minimal FDG avidity (comparable to mediastinal blood pool activity), with small focus of slightly greater activity just to the right of midline. Etiology remains indeterminate. Histologic evaluation is recommended. - FDG-avid prevascular, bilateral axillary and bilateral cervical lymph node are nonspecific. The nodes are similar, slightly increased in size, or better seen than on CT dated 4/28/2024. Cervical and axillary lymph nodes may be further evaluated with ultrasound and percutaneous needle biopsy, as clinically indicated. - Mildly FDG-avid subcentimeter soft tissue nodule, inferomedial aspect of left breast. Differential diagnosis includes malignancy. Please correlate with mammogram and breast ultrasound and biopsy, as indicated, for further characterization. - Trace right pleural effusion or pericardial effusions are unchanged. Non-FDG-avid bibasilar linear opacities are favored to represent atelectasis. - Indeterminate FDG avid focus within right hip joint. This may represent a focus of synovitis. MRI may be obtained for further evaluation.  I have reviewed the patient's medical records and diagnostic images at time of this office consultation and have made the following recommendation: 1. CT chest, PFTs, PET/CT reviewed and explained to patient, we discussed anterior mediastinal mass and lymphadenopathy, differential including lymphoma vs thymoma. Lymphoma is treated with Chemotherapy, Thymoma require surgical resection. Will refer to IR for a CT guided biopsy of anterior mediastinal mass first and RTC on 06/25/2024 via TTM to discuss findings and plan of care.   2. Barium esophagram for evaluate esophageal spasm and heart burn.   I, MARCELINO Yu, personally performed the evaluation and management (E/M) services for this new patient.  That E/M includes conducting the initial examination, assessing all conditions, and establishing the plan of care.  Today, my ACP, Azul Cates ANP-C, was here to observe my evaluation and management services for this patient to be followed going forward.

## 2024-05-21 NOTE — HISTORY OF PRESENT ILLNESS
[FreeTextEntry1] : Ms. AYAKA GONZALES, 58 year old female, never smoker, w/ hx of DM, HLD, asthma, esophageal spasm, who presented to [facility] ED on 04/28/2024 for chest pain and dyspnea. Prior the ED visit, patient went to  on 04/22/2024 was given azithromycin for PNA.  + COVID on 04/28/2024. CT chest showed anterior mediastinal mass. Patient was discharged with Cefuroxime. Referred by Dr. Jax Hernadez (PCP).   CT chest on 04/28/2024: - here is asymmetric right-sided volume loss with elevation the right hemidiaphragm and streaky bibasilar opacities, right greater than left, with some possible superimposed consolidative components within the right middle lower lobes. There is a 4 mm nodule within the inferior aspect of the right upper lobe (7-63).  - Small right pleural effusion. -  There is a 2.5 x 6.9 cm soft tissue mass within the anterior mediastinum superiorly (6-60). There is also a small amount of fluid along the ascending thoracic aorta, which could be due to a prominent pericardial recess.  Patient established care with Dr. Jared Fisher (Pulm) on 05/06/2024.  PFTs on 05/06/2024: FEV1 1.44 (61%), DLCO 84%.   PET/CT on 05/14/2024:  - Anterior mediastinal soft tissue mass, predominantly with minimal FDG avidity (comparable to mediastinal blood pool activity), with small focus of slightly greater activity just to the right of midline. Etiology remains indeterminate. Histologic evaluation is recommended. - FDG-avid prevascular, bilateral axillary and bilateral cervical lymph node are nonspecific. The nodes are similar, slightly increased in size, or better seen than on CT dated 4/28/2024. Cervical and axillary lymph nodes may be further evaluated with ultrasound and percutaneous needle biopsy, as clinically indicated. - Mildly FDG-avid subcentimeter soft tissue nodule, inferomedial aspect of left breast. Differential diagnosis includes malignancy. Please correlate with mammogram and breast ultrasound and biopsy, as indicated, for further characterization. - Trace right pleural effusion or pericardial effusions are unchanged. Non-FDG-avid bibasilar linear opacities are favored to represent atelectasis. - Indeterminate FDG avid focus within right hip joint. This may represent a focus of synovitis. MRI may be obtained for further evaluation.  Patient is here today for CT surgery consultation. Patient c/o SOB but improving, denies cough, chest pain, fever, chills, loss of appetite, weight loss, or hemoptysis. c/o right side upper chest and Lower abdominal pain, pending US by GI.

## 2024-05-21 NOTE — CONSULT LETTER
[FreeTextEntry2] : Dr. Jax Hernadez (PCP/Ref) [FreeTextEntry3] : Delroy Crocker MD Director of Thoracic, [facility] Cardiovascular & Thoracic Surgery Assitant Professor Cardiovascular & Thoracic Surgery [facility]

## 2024-05-22 ENCOUNTER — NON-APPOINTMENT (OUTPATIENT)
Age: 59
End: 2024-05-22

## 2024-05-24 ENCOUNTER — APPOINTMENT (OUTPATIENT)
Dept: RADIOLOGY | Facility: HOSPITAL | Age: 59
End: 2024-05-24
Payer: COMMERCIAL

## 2024-05-24 ENCOUNTER — OUTPATIENT (OUTPATIENT)
Dept: OUTPATIENT SERVICES | Facility: HOSPITAL | Age: 59
LOS: 1 days | End: 2024-05-24

## 2024-05-24 DIAGNOSIS — R12 HEARTBURN: ICD-10-CM

## 2024-05-24 PROCEDURE — 74220 X-RAY XM ESOPHAGUS 1CNTRST: CPT | Mod: 26

## 2024-06-07 ENCOUNTER — APPOINTMENT (OUTPATIENT)
Dept: ULTRASOUND IMAGING | Facility: IMAGING CENTER | Age: 59
End: 2024-06-07
Payer: COMMERCIAL

## 2024-06-07 ENCOUNTER — OUTPATIENT (OUTPATIENT)
Dept: OUTPATIENT SERVICES | Facility: HOSPITAL | Age: 59
LOS: 1 days | End: 2024-06-07
Payer: COMMERCIAL

## 2024-06-07 ENCOUNTER — RESULT REVIEW (OUTPATIENT)
Age: 59
End: 2024-06-07

## 2024-06-07 DIAGNOSIS — R59.1 GENERALIZED ENLARGED LYMPH NODES: ICD-10-CM

## 2024-06-07 PROCEDURE — 88173 CYTOPATH EVAL FNA REPORT: CPT | Mod: 26

## 2024-06-07 PROCEDURE — 88173 CYTOPATH EVAL FNA REPORT: CPT

## 2024-06-07 PROCEDURE — 87205 SMEAR GRAM STAIN: CPT

## 2024-06-07 PROCEDURE — 88185 FLOWCYTOMETRY/TC ADD-ON: CPT

## 2024-06-07 PROCEDURE — 88305 TISSUE EXAM BY PATHOLOGIST: CPT

## 2024-06-07 PROCEDURE — 88189 FLOWCYTOMETRY/READ 16 & >: CPT

## 2024-06-07 PROCEDURE — 88305 TISSUE EXAM BY PATHOLOGIST: CPT | Mod: 26

## 2024-06-07 PROCEDURE — 88184 FLOWCYTOMETRY/ TC 1 MARKER: CPT

## 2024-06-07 PROCEDURE — 10005 FNA BX W/US GDN 1ST LES: CPT

## 2024-06-07 PROCEDURE — 88172 CYTP DX EVAL FNA 1ST EA SITE: CPT

## 2024-06-10 LAB — TM INTERPRETATION: SIGNIFICANT CHANGE UP

## 2024-06-11 ENCOUNTER — APPOINTMENT (OUTPATIENT)
Dept: THORACIC SURGERY | Facility: CLINIC | Age: 59
End: 2024-06-11
Payer: COMMERCIAL

## 2024-06-11 DIAGNOSIS — R59.1 GENERALIZED ENLARGED LYMPH NODES: ICD-10-CM

## 2024-06-11 DIAGNOSIS — D49.89 NEOPLASM OF UNSPECIFIED BEHAVIOR OF OTHER SPECIFIED SITES: ICD-10-CM

## 2024-06-11 LAB — NON-GYNECOLOGICAL CYTOLOGY STUDY: SIGNIFICANT CHANGE UP

## 2024-06-11 PROCEDURE — 99443: CPT

## 2024-06-12 PROBLEM — D49.89 ANTERIOR MEDIASTINAL TUMOR: Status: ACTIVE | Noted: 2024-05-03

## 2024-06-12 PROBLEM — R59.1 LYMPHADENOPATHY: Status: ACTIVE | Noted: 2024-05-21

## 2024-06-12 NOTE — ASSESSMENT
[FreeTextEntry1] : Ms. AYAKA GONZALES, 58 year old female, never smoker, w/ hx of DM, HLD, asthma, esophageal spasm, who presented to [facility] ED on 04/28/2024 for chest pain and dyspnea. Prior the ED visit, patient went to  on 04/22/2024 was given azithromycin for PNA.  + COVID on 04/28/2024. CT chest showed anterior mediastinal mass. Patient was discharged with Cefuroxime. Referred by Dr. Jax Hernadez (PCP).   CT chest on 04/28/2024: - here is asymmetric right-sided volume loss with elevation the right hemidiaphragm and streaky bibasilar opacities, right greater than left, with some possible superimposed consolidative components within the right middle lower lobes. There is a 4 mm nodule within the inferior aspect of the right upper lobe (7-63).  - Small right pleural effusion. -  There is a 2.5 x 6.9 cm soft tissue mass within the anterior mediastinum superiorly (6-60). There is also a small amount of fluid along the ascending thoracic aorta, which could be due to a prominent pericardial recess.  Patient established care with Dr. Jared Fisher (Pulm) on 05/06/2024.  PFTs on 05/06/2024: FEV1 1.44 (61%), DLCO 84%.   PET/CT on 05/14/2024:  - Anterior mediastinal soft tissue mass, predominantly with minimal FDG avidity (comparable to mediastinal blood pool activity), with small focus of slightly greater activity just to the right of midline. Etiology remains indeterminate. Histologic evaluation is recommended. - FDG-avid prevascular, bilateral axillary and bilateral cervical lymph node are nonspecific. The nodes are similar, slightly increased in size, or better seen than on CT dated 4/28/2024. Cervical and axillary lymph nodes may be further evaluated with ultrasound and percutaneous needle biopsy, as clinically indicated. - Mildly FDG-avid subcentimeter soft tissue nodule, inferomedial aspect of left breast. Differential diagnosis includes malignancy. Please correlate with mammogram and breast ultrasound and biopsy, as indicated, for further characterization. - Trace right pleural effusion or pericardial effusions are unchanged. Non-FDG-avid bibasilar linear opacities are favored to represent atelectasis. - Indeterminate FDG avid focus within right hip joint. This may represent a focus of synovitis. MRI may be obtained for further evaluation.  Barium esophagram on 05/24/2024: - The 13 mm barium tablet did not pass the inferior esophagus which features smooth tapering to the level of gastroesophageal junction. Despite this liquid contrast passed freely into stomach. - No gastroesophageal reflux demonstrated. No hiatal hernia.  I have reviewed the patient's medical records and diagnostic images at time of this office consultation and have made the following recommendation: 1. Path of LN reviewed with pt, negative for lymphoma. Discussed with pt would like to continue surveillance 1-2 mons to see if any growth vs. surgical resection. If the mass grows in size in the repeat scan, would definitely recommend resection at that time. Pt agreed with the plan. RTC in Aug 2024 for repeat CT Chest w/o contrast.  2. Barium reviewed; barium tab did not pass through. However, pt reports he is eating fine, will assess in next visit.   I, MARCELINO Yu, personally performed the evaluation and management (E/M) services for this established patient who presents today with (a) new problem(s)/exacerbation of (an) existing condition(s).  That E/M includes conducting the examination, assessing all new/exacerbated conditions, and establishing a new plan of care.  Today, my ACP, CHIQUITA Obando was here to observe my evaluation and management services for this new problem/exacerbated condition to be followed going forward.

## 2024-06-12 NOTE — HISTORY OF PRESENT ILLNESS
[FreeTextEntry1] : Ms. AYAKA GONZALES, 58 year old female, never smoker, w/ hx of DM, HLD, asthma, esophageal spasm, who presented to [facility] ED on 04/28/2024 for chest pain and dyspnea. Prior the ED visit, patient went to  on 04/22/2024 was given azithromycin for PNA.  + COVID on 04/28/2024. CT chest showed anterior mediastinal mass. Patient was discharged with Cefuroxime. Referred by Dr. Jax Hernadez (PCP).   CT chest on 04/28/2024: - here is asymmetric right-sided volume loss with elevation the right hemidiaphragm and streaky bibasilar opacities, right greater than left, with some possible superimposed consolidative components within the right middle lower lobes. There is a 4 mm nodule within the inferior aspect of the right upper lobe (7-63).  - Small right pleural effusion. -  There is a 2.5 x 6.9 cm soft tissue mass within the anterior mediastinum superiorly (6-60). There is also a small amount of fluid along the ascending thoracic aorta, which could be due to a prominent pericardial recess.  Patient established care with Dr. Jared Fisher (Pulm) on 05/06/2024.  PFTs on 05/06/2024: FEV1 1.44 (61%), DLCO 84%.   PET/CT on 05/14/2024:  - Anterior mediastinal soft tissue mass, predominantly with minimal FDG avidity (comparable to mediastinal blood pool activity), with small focus of slightly greater activity just to the right of midline. Etiology remains indeterminate. Histologic evaluation is recommended. - FDG-avid prevascular, bilateral axillary and bilateral cervical lymph node are nonspecific. The nodes are similar, slightly increased in size, or better seen than on CT dated 4/28/2024. Cervical and axillary lymph nodes may be further evaluated with ultrasound and percutaneous needle biopsy, as clinically indicated. - Mildly FDG-avid subcentimeter soft tissue nodule, inferomedial aspect of left breast. Differential diagnosis includes malignancy. Please correlate with mammogram and breast ultrasound and biopsy, as indicated, for further characterization. - Trace right pleural effusion or pericardial effusions are unchanged. Non-FDG-avid bibasilar linear opacities are favored to represent atelectasis. - Indeterminate FDG avid focus within right hip joint. This may represent a focus of synovitis. MRI may be obtained for further evaluation.  On 05/21/2024, we discussed anterior mediastinal mass and lymphadenopathy, differential including lymphoma vs thymoma. Lymphoma is treated with Chemotherapy, Thymoma require surgical resection. Will refer to IR for a CT guided biopsy of anterior mediastinal mass first and RTC on 06/25/2024 via TTM to discuss findings and plan of care.    Barium esophagram on 05/24/2024: - The 13 mm barium tablet did not pass the inferior esophagus which features smooth tapering to the level of gastroesophageal junction. Despite this liquid contrast passed freely into stomach. - No gastroesophageal reflux demonstrated. No hiatal hernia.  US guided biopsy of Lt axillary LN on 06/07/2024. Path revealed NEGATIVE FOR MALIGNANT CELLS. Favor reactive lymph node  Patient is followed today via Telehealth visit.

## 2024-06-12 NOTE — CONSULT LETTER
[Dear  ___] : Dear  [unfilled], [Consult Letter:] : I had the pleasure of evaluating your patient, [unfilled]. [Please see my note below.] : Please see my note below. [Consult Closing:] : Thank you very much for allowing me to participate in the care of this patient.  If you have any questions, please do not hesitate to contact me. [Sincerely,] : Sincerely, [FreeTextEntry2] : Dr. Jax Hernadez (PCP/Ref) [FreeTextEntry3] : Delroy Crocker MD Director of Thoracic, [facility] Cardiovascular & Thoracic Surgery Assitant Professor Cardiovascular & Thoracic Surgery [facility]

## 2024-06-12 NOTE — DATA REVIEWED
[FreeTextEntry1] : I have independently reviewed the following: US guided biopsy of Lt axillary LN on 06/07/2024 Barium esophagram on 05/24/2024

## 2024-07-31 ENCOUNTER — APPOINTMENT (OUTPATIENT)
Dept: PULMONOLOGY | Facility: CLINIC | Age: 59
End: 2024-07-31
Payer: COMMERCIAL

## 2024-07-31 PROCEDURE — 99442: CPT

## 2024-07-31 NOTE — HISTORY OF PRESENT ILLNESS
[Never] : never [TextBox_4] : I spoke to Ms. Izzy Martinez today in follow-up for her anterior mediastinal mass; she was located at work at the time of our visit. In review, Ms. Martinez is a 58 yo F h/o obesity c/b DM, HLD, and asthma who had urgent care visits for pleuritic chest pain on 24 (treated with Azithromycin) and 24. On the 24 visit, she was found to have COVID and was transferred to the ER where chest CT identified an anterior mediastinal mass.  Ms. Martinez is cared for by Dr. Hernadez in primary care who arranged her upcoming visit with Dr. Crocker. She has been seen for her asthma by Dr. Wilson ([place]) and treated with Asmanex/Mometasone; she has not used ICS nor Albuterol in several years. She has previously seen Dr. Isaak Velasquez with ENT for sinus disease; she denies any throat symptoms or pain.  24: Ms. Martinez reports overall feeling better than during her ER visit. The pleuritic chest pain is significantly improved. She endorses intermittent hiccups recently and longer-term weight gain. She denies fever, chills, weight loss, or weakness. She has had night sweats for "years" without recent change. Ms. Martinez reports a hospitalization in  for "pleurisy and pleural effusion." She did not undergo thoracentesis; she was treated with antibiotics and steroids with improvement.  24: Ms. Martinez had her PET/CT last week. She has multiple concerns and symptoms today, including "burning" lower right chest pain that is constant and is making it difficult for her to sleep. She does not have worsening with exertion or relief with rest. She saw her GI physician last week who recommended a trial of Dicyclomine and an US (? RUQ US). She does not have dizziness or dyspnea. She additionally has bilateral hip and knee pains. We reviewed her PET/CT scan findings. She had a mammogram with Dr. Hernadez in 10/2023 ([id number]).  24: Ms. Martinez is feeling well today. Since our last visit, she met with Dr. Crocker and underwent an US guided biopsy of an axillary lymph node (negative for malignancy). A follow-up CT is planned for next week. We planned to follow-up on multiple issues not addressed in May. Her asthma is stable. In regard to her PET/CT identifying focal activity in her R hip, she reports that her R hip is not causing pain. She is amenable to an MRI and has had no difficulties with MRIs in the past. In regard to her flow-volume loop, she planned to have a follow-up with her ENT (Dr. Isaak Velasquez); she has not yet arranged the appointment. She denies throat pain or voice change. She plans to have a follow-up mammogram with Dr. Hernadez in 1-2 months.   PMH: Obesity DM HLD Asthma Anterior mediastinal mass Breast nodule  PSH: Breast biopsy  FH: Father with CAD Mother  suddenly (unknown etiology)  SH: Ms. Martinez is from [place]. She currently lives in [facility]; she moved to the  in . 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 Martinez is a never smoker. She denies vaping or drug use. She drinks alcohol rarely.  Ms. Martinez endorses exposure to dust. She denies exposure to Asbestos, Tuberculosis, mold, smoke, fumes, birds, feathers, hot tubs, heavy metals, or Beryllium.

## 2024-07-31 NOTE — ASSESSMENT
[FreeTextEntry1] : Labs 4/2024 WBC 6.29, Hgb 12.0, Plts 320 Na 142, K 3.6, Cl 108, HCO3 30, BUN/creat 14/0.83, glucose 114 Tbili 0.2, AST/ALT 20/19, Alk phos 106, TP/Albumin 8.2/3.0 + COVID  hCG: negative AFP 11.8 (wnl <8.3) LDH: 223 TSH: 2.74  Pathology: Final Diagnosis  LYMPH NODE, AXILLARY, LEFT, US GUIDED FNA  NEGATIVE FOR MALIGNANT CELLS  Favor reactive lymph node (see flow cytometry studies below)  The cytology slides and cell block shows a polymorphous population of lymphocytes. [place] cells, granulomas, or abnormal epithelial cells are not identified. If a lymphoproliferative disorder is suspected, or lymphadenopathy persists, tissue examination is recommend.  Flow Cytometry: INTERPRETATION:  MORPHOLOGY: Heterogeneous population of lymphocytes.  IMMUNOPHENOTYPE: Lymphocytes (97% of cells): Heterogeneous population of T-cells (with normal CD4 to CD8 ratio) and polytypic B-cells."   Chest CT (4/2024): IMPRESSION: There is an approximately 2.5 x 6.9 cm soft tissue mass within the anterior mediastinum, concerning for malignancy. Correlation with prior imaging is recommended if available. Further evaluation with either chest MRI without and with contrast and/or biopsy is suggested. Small right pleural effusion with multifocal bibasilar streaky opacities and right-sided volume loss, favoring multifocal atelectasis, though superimposed infection is possible in the appropriate clinical setting.  PET/CT (5/2024): IMPRESSION:  1. Anterior mediastinal soft tissue mass, predominantly with minimal FDG avidity (comparable to mediastinal blood pool activity), with small focus of slightly greater activity just to the right of midline. Etiology remains indeterminate. Histologic evaluation is recommended. 2. FDG-avid prevascular, bilateral axillary and bilateral cervical lymph node are nonspecific. The nodes are similar, slightly increased in size, or better seen than on CT dated 4/28/2024. Cervical and axillary lymph nodes may be further evaluated with ultrasound and percutaneous needle biopsy, as clinically indicated. 3. Mildly FDG-avid subcentimeter soft tissue nodule, inferomedial aspect of left breast. Differential diagnosis includes malignancy. Please correlate with mammogram and breast ultrasound and biopsy, as indicated, for further characterization. 4. Trace right pleural effusion or pericardial effusions are unchanged. Non-FDG-avid bibasilar linear opacities are favored to represent atelectasis. 5. Indeterminate FDG avid focus within right hip joint. This may represent a focus of synovitis. MRI may be obtained for further evaluation.  Esophagram (5/2024): IMPRESSION: The 13 mm barium tablet did not pass the inferior esophagus which features smooth tapering to the level of gastroesophageal junction. Despite this liquid contrast passed freely into stomach.  PFTs:              5/2024 FEV1/FVC      80% FEV1               1.63, 69% FVC                 2.03, 68% TLC                 3.25, 68% RV                   1.31, 73% ERV                0.31, 35% DLCO              19.2, 84% -+ Bronchodilator response 5/2024  Asthma control test 5/2024: 22  A/P: 60 yo F h/o adult-onset asthma (years), obesity, and DM presents with an anterior mediastinal mass discovered during COVID infection with pleuritic chest pain.  Ms. Martinez's asthma symptoms have been well controlled. Her anterior mediastinal mass is being considered for surveillance vs resection. She has a repeat chest CT next week.  I aimed to follow-up on issues from last visit that we could not address. For the borderline flattening of her flow-volume loop, she planned to have a follow-up ENT visit (rather than repeat her PFTs). She has not yet scheduled. She reports that she will schedule a follow-up for 1-2 weeks.  For her incidental R hip joint PET/CT uptake, she is not having symptoms. I recommended an MRI. She has tolerated MRIs in the past without difficulty. She requests to have the study done at [facility]. We will order the study and send her the prescription.  For the breast nodule on PET/CT, she had a mammogram in 10/2023 ([facility]) that was BiRads1; she plans to arrange a follow-up mammogram with Dr. Hernadez in 1-2 months.  Ms. Martinez had an esophagram since our last visit wherein the tablet did not pass into the inferior esophagus. She already follows with a GI physician.  1. Asthma: well-controlled 2. Anterior mediastinal mass 3. Restrictive lung disease: ? obesity 4. ? Flattening on inspiratory limb of flow-volume loop 5. Abnormal PET/CT: cervical lymph nodes, axillary lymph nodes, breast nodule, R hip uptake  -appreciate Thoracic surgery consult; chest CT planned for next week -she is following with her ENT and GI physicians -recommended updating her mammogram  -R hip MRI  -PRN Albuterol -follow-up with me in 6-8 weeks

## 2024-07-31 NOTE — PHYSICAL EXAM
[No Acute Distress] : no acute distress [TextBox_2] : Unable to obtain via phone visit; breathing and speaking comfortably over the phone. no

## 2024-07-31 NOTE — REVIEW OF SYSTEMS
[Negative] : Endocrine [TextBox_3] : Fatigue [TextBox_30] : Pleuritic chest pain that is improving since her ER visit [TextBox_94] : L hip pain

## 2024-08-05 ENCOUNTER — APPOINTMENT (OUTPATIENT)
Dept: CT IMAGING | Facility: IMAGING CENTER | Age: 59
End: 2024-08-05

## 2024-08-05 ENCOUNTER — OUTPATIENT (OUTPATIENT)
Dept: OUTPATIENT SERVICES | Facility: HOSPITAL | Age: 59
LOS: 1 days | End: 2024-08-05
Payer: COMMERCIAL

## 2024-08-05 DIAGNOSIS — D49.89 NEOPLASM OF UNSPECIFIED BEHAVIOR OF OTHER SPECIFIED SITES: ICD-10-CM

## 2024-08-05 DIAGNOSIS — R59.1 GENERALIZED ENLARGED LYMPH NODES: ICD-10-CM

## 2024-08-05 PROCEDURE — 71250 CT THORAX DX C-: CPT

## 2024-08-05 PROCEDURE — 71250 CT THORAX DX C-: CPT | Mod: 26

## 2024-08-30 NOTE — CONSULT LETTER
[Dear  ___] : Dear  [unfilled], [Consult Letter:] : I had the pleasure of evaluating your patient, [unfilled]. [Please see my note below.] : Please see my note below. [Consult Closing:] : Thank you very much for allowing me to participate in the care of this patient.  If you have any questions, please do not hesitate to contact me. [Sincerely,] : Sincerely, [FreeTextEntry2] : Dr. Jax Hernadez (PCP/Ref) [FreeTextEntry3] : Delroy Crocker MD Director of Thoracic, [facility] Cardiovascular & Thoracic Surgery Assitant Professor Cardiovascular & Thoracic Surgery [facility]

## 2024-08-30 NOTE — ASSESSMENT
[FreeTextEntry1] : Ms. AYAKA GONZALES, 58 year old female, never smoker, w/ hx of DM, HLD, asthma, esophageal spasm, who presented to [facility] ED on 04/28/2024 for chest pain and dyspnea. Prior the ED visit, patient went to  on 04/22/2024 was given azithromycin for PNA.  + COVID on 04/28/2024. CT chest showed anterior mediastinal mass. Patient was discharged with Cefuroxime. Referred by Dr. Jax Hernadez (PCP).   Barium esophagram on 05/24/2024: - The 13 mm barium tablet did not pass the inferior esophagus which features smooth tapering to the level of gastroesophageal junction. Despite this liquid contrast passed freely into stomach. - No gastroesophageal reflux demonstrated. No hiatal hernia.  CT chest on 08/05/2024:  - 3.6 x 1.7 cm isodense structure within anterior mediastinum is decreased, previously 6.2 x 2.9 cm. This can represent thymic hyperplasia/remnant thymic tissue. - Bilateral lower lobe opacities are nearly resolved. Stable 2 mm right upper lobe nodule. - Stable 0.7 cm subcutaneous nodule within left anterior chest wall adjacent to the left anterior 5th rib. Stable subcentimeter axillary lymph nodes.  I have reviewed the patient's medical records and diagnostic images at time of this office consultation and have made the following recommendation: 1.

## 2024-08-30 NOTE — HISTORY OF PRESENT ILLNESS
[FreeTextEntry1] : Ms. AYAKA GONZALES, 59 year old female, never smoker, w/ hx of DM, HLD, asthma, esophageal spasm, who presented to [facility] ED on 04/28/2024 for chest pain and dyspnea. Prior the ED visit, patient went to  on 04/22/2024 was given azithromycin for PNA.  + COVID on 04/28/2024. CT chest showed anterior mediastinal mass. Patient was discharged with Cefuroxime. Referred by Dr. Jax Hernadez (PCP).   CT chest on 04/28/2024: - here is asymmetric right-sided volume loss with elevation the right hemidiaphragm and streaky bibasilar opacities, right greater than left, with some possible superimposed consolidative components within the right middle lower lobes. There is a 4 mm nodule within the inferior aspect of the right upper lobe (7-63).  - Small right pleural effusion. -  There is a 2.5 x 6.9 cm soft tissue mass within the anterior mediastinum superiorly (6-60). There is also a small amount of fluid along the ascending thoracic aorta, which could be due to a prominent pericardial recess.  Patient established care with Dr. Jared Fisher (Pulm) on 05/06/2024.  PFTs on 05/06/2024: FEV1 1.44 (61%), DLCO 84%.   PET/CT on 05/14/2024:  - Anterior mediastinal soft tissue mass, predominantly with minimal FDG avidity (comparable to mediastinal blood pool activity), with small focus of slightly greater activity just to the right of midline. Etiology remains indeterminate. Histologic evaluation is recommended. - FDG-avid prevascular, bilateral axillary and bilateral cervical lymph node are nonspecific. The nodes are similar, slightly increased in size, or better seen than on CT dated 4/28/2024. Cervical and axillary lymph nodes may be further evaluated with ultrasound and percutaneous needle biopsy, as clinically indicated. - Mildly FDG-avid subcentimeter soft tissue nodule, inferomedial aspect of left breast. Differential diagnosis includes malignancy. Please correlate with mammogram and breast ultrasound and biopsy, as indicated, for further characterization. - Trace right pleural effusion or pericardial effusions are unchanged. Non-FDG-avid bibasilar linear opacities are favored to represent atelectasis. - Indeterminate FDG avid focus within right hip joint. This may represent a focus of synovitis. MRI may be obtained for further evaluation.  On 05/21/2024, we discussed anterior mediastinal mass and lymphadenopathy, differential including lymphoma vs thymoma. Lymphoma is treated with Chemotherapy, Thymoma require surgical resection. Will refer to IR for a CT guided biopsy of anterior mediastinal mass first and RTC on 06/25/2024 via TTM to discuss findings and plan of care.    Barium esophagram on 05/24/2024: - The 13 mm barium tablet did not pass the inferior esophagus which features smooth tapering to the level of gastroesophageal junction. Despite this liquid contrast passed freely into stomach. - No gastroesophageal reflux demonstrated. No hiatal hernia.  US guided biopsy of Lt axillary LN on 06/07/2024. Path revealed NEGATIVE FOR MALIGNANT CELLS. Favor reactive lymph node  On 06/11/2024, Path of LN reviewed with pt, negative for lymphoma. Discussed with pt would like to continue surveillance 1-2 mons to see if any growth vs. surgical resection. If the mass grows in size in the repeat scan, would definitely recommend resection at that time. Pt agreed with the plan. RTC in Aug 2024 for repeat CT Chest w/o contrast. Barium reviewed; barium tab did not pass through. However, pt reports he is eating fine, will assess in next visit.  CT chest on 08/05/2024:  - 3.6 x 1.7 cm isodense structure within anterior mediastinum is decreased, previously 6.2 x 2.9 cm. This can represent thymic hyperplasia/remnant thymic tissue. - Bilateral lower lobe opacities are nearly resolved. Stable 2 mm right upper lobe nodule. - Stable 0.7 cm subcutaneous nodule within left anterior chest wall adjacent to the left anterior 5th rib. Stable subcentimeter axillary lymph nodes.  Depression screening completed on   Patient is here today for a follow up.

## 2024-09-05 NOTE — ASSESSMENT
[FreeTextEntry1] : Ms. AYAKA GONZALES, 58 year old female, never smoker, w/ hx of DM, HLD, asthma, esophageal spasm, who presented to [facility] ED on 04/28/2024 for chest pain and dyspnea. Prior the ED visit, patient went to  on 04/22/2024 was given azithromycin for PNA.  + COVID on 04/28/2024. CT chest showed anterior mediastinal mass. Patient was discharged with Cefuroxime. Referred by Dr. Jax Hernadez (PCP).   Barium esophagram on 05/24/2024: - The 13 mm barium tablet did not pass the inferior esophagus which features smooth tapering to the level of gastroesophageal junction. Despite this liquid contrast passed freely into stomach. - No gastroesophageal reflux demonstrated. No hiatal hernia.  CT chest on 08/05/2024:  - 3.6 x 1.7 cm isodense structure within anterior mediastinum is decreased, previously 6.2 x 2.9 cm. This can represent thymic hyperplasia/remnant thymic tissue. - Bilateral lower lobe opacities are nearly resolved. Stable 2 mm right upper lobe nodule. - Stable 0.7 cm subcutaneous nodule within left anterior chest wall adjacent to the left anterior 5th rib. Stable subcentimeter axillary lymph nodes.  I have reviewed the patient's medical records and diagnostic images at time of this office consultation and have made the following recommendation: 1.   I, MARCELINO Yu, personally performed the evaluation and management (E/M) services for this established patient who follow up today with an existing condition.  That E/M includes conducting the examination, assessing all new/exacerbated/existing conditions, and establishing a plan of care.  Today, my ACP, SOLITARIO Ruiz, was here to observe my evaluation and management services for this existing condition to be followed going forward.

## 2024-09-05 NOTE — CONSULT LETTER
[FreeTextEntry2] : Dr. Jax Hernadez (PCP/Ref) [FreeTextEntry3] : Delroy Crocker MD Director of Thoracic, [facility] Cardiovascular & Thoracic Surgery Assitant Professor Cardiovascular & Thoracic Surgery [facility]

## 2024-09-05 NOTE — HISTORY OF PRESENT ILLNESS
[FreeTextEntry1] : Ms. AYAKA GONZALES, 59 year old female, never smoker, w/ hx of DM, HLD, asthma, esophageal spasm, who presented to [facility] ED on 04/28/2024 for chest pain and dyspnea. Prior the ED visit, patient went to  on 04/22/2024 was given azithromycin for PNA.  + COVID on 04/28/2024. CT chest showed anterior mediastinal mass. Patient was discharged with Cefuroxime. Referred by Dr. Jax Hernadez (PCP).   CT chest on 04/28/2024: - here is asymmetric right-sided volume loss with elevation the right hemidiaphragm and streaky bibasilar opacities, right greater than left, with some possible superimposed consolidative components within the right middle lower lobes. There is a 4 mm nodule within the inferior aspect of the right upper lobe (7-63).  - Small right pleural effusion. -  There is a 2.5 x 6.9 cm soft tissue mass within the anterior mediastinum superiorly (6-60). There is also a small amount of fluid along the ascending thoracic aorta, which could be due to a prominent pericardial recess.  Patient established care with Dr. Jared Fisher (Pulm) on 05/06/2024.  PFTs on 05/06/2024: FEV1 1.44 (61%), DLCO 84%.   PET/CT on 05/14/2024:  - Anterior mediastinal soft tissue mass, predominantly with minimal FDG avidity (comparable to mediastinal blood pool activity), with small focus of slightly greater activity just to the right of midline. Etiology remains indeterminate. Histologic evaluation is recommended. - FDG-avid prevascular, bilateral axillary and bilateral cervical lymph node are nonspecific. The nodes are similar, slightly increased in size, or better seen than on CT dated 4/28/2024. Cervical and axillary lymph nodes may be further evaluated with ultrasound and percutaneous needle biopsy, as clinically indicated. - Mildly FDG-avid subcentimeter soft tissue nodule, inferomedial aspect of left breast. Differential diagnosis includes malignancy. Please correlate with mammogram and breast ultrasound and biopsy, as indicated, for further characterization. - Trace right pleural effusion or pericardial effusions are unchanged. Non-FDG-avid bibasilar linear opacities are favored to represent atelectasis. - Indeterminate FDG avid focus within right hip joint. This may represent a focus of synovitis. MRI may be obtained for further evaluation.  On 05/21/2024, we discussed anterior mediastinal mass and lymphadenopathy, differential including lymphoma vs thymoma. Lymphoma is treated with Chemotherapy, Thymoma require surgical resection. Will refer to IR for a CT guided biopsy of anterior mediastinal mass first and RTC on 06/25/2024 via TTM to discuss findings and plan of care.    Barium esophagram on 05/24/2024: - The 13 mm barium tablet did not pass the inferior esophagus which features smooth tapering to the level of gastroesophageal junction. Despite this liquid contrast passed freely into stomach. - No gastroesophageal reflux demonstrated. No hiatal hernia.  US guided biopsy of Lt axillary LN on 06/07/2024. Path revealed NEGATIVE FOR MALIGNANT CELLS. Favor reactive lymph node  On 06/11/2024, Path of LN reviewed with pt, negative for lymphoma. Discussed with pt would like to continue surveillance 1-2 mons to see if any growth vs. surgical resection. If the mass grows in size in the repeat scan, would definitely recommend resection at that time. Pt agreed with the plan. RTC in Aug 2024 for repeat CT Chest w/o contrast. Barium reviewed; barium tab did not pass through. However, pt reports he is eating fine, will assess in next visit.  CT chest on 08/05/2024:  - 3.6 x 1.7 cm isodense structure within anterior mediastinum is decreased, previously 6.2 x 2.9 cm. This can represent thymic hyperplasia/remnant thymic tissue. - Bilateral lower lobe opacities are nearly resolved. Stable 2 mm right upper lobe nodule. - Stable 0.7 cm subcutaneous nodule within left anterior chest wall adjacent to the left anterior 5th rib. Stable subcentimeter axillary lymph nodes.  Depression screening completed on   Patient is here today for a follow up.

## 2024-09-09 ENCOUNTER — NON-APPOINTMENT (OUTPATIENT)
Age: 59
End: 2024-09-09

## 2024-09-10 ENCOUNTER — APPOINTMENT (OUTPATIENT)
Dept: THORACIC SURGERY | Facility: CLINIC | Age: 59
End: 2024-09-10

## 2024-09-10 DIAGNOSIS — D49.89 NEOPLASM OF UNSPECIFIED BEHAVIOR OF OTHER SPECIFIED SITES: ICD-10-CM
